# Patient Record
Sex: FEMALE | Race: WHITE
[De-identification: names, ages, dates, MRNs, and addresses within clinical notes are randomized per-mention and may not be internally consistent; named-entity substitution may affect disease eponyms.]

---

## 2019-04-08 ENCOUNTER — HOSPITAL ENCOUNTER (EMERGENCY)
Dept: HOSPITAL 94 - ER | Age: 49
Discharge: LEFT BEFORE DISCHARGE | End: 2019-04-08
Payer: MEDICAID

## 2019-04-08 VITALS — HEIGHT: 60 IN | WEIGHT: 202.83 LBS | BODY MASS INDEX: 39.82 KG/M2

## 2019-04-08 VITALS — DIASTOLIC BLOOD PRESSURE: 97 MMHG | SYSTOLIC BLOOD PRESSURE: 143 MMHG

## 2019-04-08 DIAGNOSIS — E11.9: ICD-10-CM

## 2019-04-08 DIAGNOSIS — Z56.0: ICD-10-CM

## 2019-04-08 DIAGNOSIS — R06.02: Primary | ICD-10-CM

## 2019-04-08 DIAGNOSIS — Z88.0: ICD-10-CM

## 2019-04-08 DIAGNOSIS — F41.9: ICD-10-CM

## 2019-04-08 DIAGNOSIS — F12.90: ICD-10-CM

## 2019-04-08 DIAGNOSIS — Z79.899: ICD-10-CM

## 2019-04-08 PROCEDURE — 99284 EMERGENCY DEPT VISIT MOD MDM: CPT

## 2019-04-08 PROCEDURE — 99283 EMERGENCY DEPT VISIT LOW MDM: CPT

## 2019-04-08 PROCEDURE — 93005 ELECTROCARDIOGRAM TRACING: CPT

## 2019-04-08 SDOH — ECONOMIC STABILITY - INCOME SECURITY: UNEMPLOYMENT, UNSPECIFIED: Z56.0

## 2019-10-07 ENCOUNTER — HOSPITAL ENCOUNTER (EMERGENCY)
Dept: HOSPITAL 94 - ER | Age: 49
Discharge: HOME | End: 2019-10-07
Payer: MEDICAID

## 2019-10-07 VITALS — BODY MASS INDEX: 40.69 KG/M2 | HEIGHT: 60 IN | WEIGHT: 207.23 LBS

## 2019-10-07 VITALS — DIASTOLIC BLOOD PRESSURE: 61 MMHG | SYSTOLIC BLOOD PRESSURE: 125 MMHG

## 2019-10-07 DIAGNOSIS — M54.12: Primary | ICD-10-CM

## 2019-10-07 DIAGNOSIS — G89.29: ICD-10-CM

## 2019-10-07 DIAGNOSIS — Z88.0: ICD-10-CM

## 2019-10-07 DIAGNOSIS — F12.90: ICD-10-CM

## 2019-10-07 DIAGNOSIS — E11.9: ICD-10-CM

## 2019-10-07 DIAGNOSIS — Z56.0: ICD-10-CM

## 2019-10-07 DIAGNOSIS — Z79.899: ICD-10-CM

## 2019-10-07 PROCEDURE — 99284 EMERGENCY DEPT VISIT MOD MDM: CPT

## 2019-10-07 PROCEDURE — 96372 THER/PROPH/DIAG INJ SC/IM: CPT

## 2019-10-07 SDOH — ECONOMIC STABILITY - INCOME SECURITY: UNEMPLOYMENT, UNSPECIFIED: Z56.0

## 2019-10-08 NOTE — NUR
PT. CALLED STATED SHE LOST HER RX FROM LAST NIGHT.  I CALLED [facility] ON [facility].  I CALLED IN LIDODERM PATCHES 5%, 1 PATCH TOPICAL DAILY (12 HOURS ON 12 
HOURS OFF) 1 BOX.  TYLENOL EXTRA STRENGTH 500MG TAKE 2 TABS Q 6 HOURS X 3 DAYS

## 2023-09-08 ENCOUNTER — HOSPITAL ENCOUNTER (EMERGENCY)
Dept: HOSPITAL 94 - ER | Age: 53
Discharge: HOME | End: 2023-09-08
Payer: MEDICAID

## 2023-09-08 VITALS
RESPIRATION RATE: 18 BRPM | TEMPERATURE: 97.1 F | SYSTOLIC BLOOD PRESSURE: 126 MMHG | HEART RATE: 77 BPM | OXYGEN SATURATION: 96 % | DIASTOLIC BLOOD PRESSURE: 63 MMHG

## 2023-09-08 VITALS — BODY MASS INDEX: 40.69 KG/M2 | WEIGHT: 207.23 LBS | HEIGHT: 60 IN
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
## 2023-09-08 DIAGNOSIS — S06.0X0A: Primary | ICD-10-CM

## 2023-09-08 DIAGNOSIS — Z56.0: ICD-10-CM

## 2023-09-08 DIAGNOSIS — Z88.0: ICD-10-CM

## 2023-09-08 DIAGNOSIS — Z79.899: ICD-10-CM
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
## 2023-09-08 DIAGNOSIS — G89.29: ICD-10-CM

## 2023-09-08 DIAGNOSIS — F12.90: ICD-10-CM

## 2023-09-08 DIAGNOSIS — E11.9: ICD-10-CM
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
## 2023-09-08 PROCEDURE — 70450 CT HEAD/BRAIN W/O DYE: CPT

## 2023-09-08 PROCEDURE — 99284 EMERGENCY DEPT VISIT MOD MDM: CPT

## 2023-09-08 SDOH — ECONOMIC STABILITY - INCOME SECURITY: UNEMPLOYMENT, UNSPECIFIED: Z56.0

## 2024-09-28 ENCOUNTER — HOSPITAL ENCOUNTER (EMERGENCY)
Dept: HOSPITAL 94 - ER | Age: 54
Discharge: HOME | End: 2024-09-28
Payer: MEDICAID

## 2024-09-28 VITALS — HEIGHT: 61 IN | WEIGHT: 193.79 LBS | BODY MASS INDEX: 36.59 KG/M2

## 2024-09-28 VITALS
HEART RATE: 88 BPM | DIASTOLIC BLOOD PRESSURE: 88 MMHG | OXYGEN SATURATION: 97 % | TEMPERATURE: 98 F | SYSTOLIC BLOOD PRESSURE: 140 MMHG | RESPIRATION RATE: 18 BRPM

## 2024-09-28 DIAGNOSIS — F12.90: ICD-10-CM

## 2024-09-28 DIAGNOSIS — L03.115: Primary | ICD-10-CM

## 2024-09-28 DIAGNOSIS — E11.9: ICD-10-CM

## 2024-09-28 DIAGNOSIS — Z79.899: ICD-10-CM

## 2024-09-28 DIAGNOSIS — G89.29: ICD-10-CM

## 2024-09-28 DIAGNOSIS — Z88.0: ICD-10-CM

## 2024-09-28 PROCEDURE — 99284 EMERGENCY DEPT VISIT MOD MDM: CPT

## 2024-09-28 PROCEDURE — 93971 EXTREMITY STUDY: CPT

## 2024-10-24 ENCOUNTER — HOSPITAL ENCOUNTER (OUTPATIENT)
Dept: HOSPITAL 94 - MRI02 | Age: 54
Discharge: HOME | End: 2024-10-24
Attending: PODIATRIST
Payer: MEDICAID

## 2024-10-24 DIAGNOSIS — M19.90: ICD-10-CM

## 2024-10-24 DIAGNOSIS — G89.29: ICD-10-CM

## 2024-10-24 DIAGNOSIS — M25.471: ICD-10-CM

## 2024-10-24 DIAGNOSIS — M25.472: ICD-10-CM

## 2024-10-24 DIAGNOSIS — M67.873: Primary | ICD-10-CM

## 2024-10-24 DIAGNOSIS — M25.579: ICD-10-CM

## 2024-10-24 PROCEDURE — 73718 MRI LOWER EXTREMITY W/O DYE: CPT
